# Patient Record
Sex: FEMALE | Race: WHITE | NOT HISPANIC OR LATINO | URBAN - METROPOLITAN AREA
[De-identification: names, ages, dates, MRNs, and addresses within clinical notes are randomized per-mention and may not be internally consistent; named-entity substitution may affect disease eponyms.]

---

## 2020-07-01 ENCOUNTER — COMMUNICATION - HEALTHEAST (OUTPATIENT)
Dept: SCHEDULING | Facility: CLINIC | Age: 25
End: 2020-07-01

## 2021-05-30 ENCOUNTER — RECORDS - HEALTHEAST (OUTPATIENT)
Dept: ADMINISTRATIVE | Facility: CLINIC | Age: 26
End: 2021-05-30

## 2021-06-02 ENCOUNTER — RECORDS - HEALTHEAST (OUTPATIENT)
Dept: ADMINISTRATIVE | Facility: CLINIC | Age: 26
End: 2021-06-02

## 2021-06-09 NOTE — TELEPHONE ENCOUNTER
Triage call:   Poison oak on her leg 2 weekends ago   Blister popped on her right leg- oozing fluid- she reports that this blister was very large   No blood- just clear fluid  Area is red   She still has a line of smaller blisters intact   No pain   intermittently itchy     COVID 19 Nurse Triage Plan/Patient Instructions    Please be aware that novel coronavirus (COVID-19) may be circulating in the community. If you develop symptoms such as fever, cough, or SOB or if you have concerns about the presence of another infection including coronavirus (COVID-19), please contact your health care provider or visit [email or web address].     Disposition/Instructions    Patient to schedule an In Person Visit with provider. Reference Visit Selection Guide. if no clinic appointments today then she was instructed to go to the [facility] in [place]- she verbalizes understanding.     Thank you for taking steps to prevent the spread of this virus.  o Limit your contact with others.  o Wear a simple mask to cover your cough.  o Wash your hands well and often.    Resources    [facility]: About COVID-19: [email or web address]    CDC: What to Do If You're Sick: www.cdc.gov/coronavirus/2019-ncov/about/steps-when-sick.html    CDC: Ending Home Isolation: www.cdc.gov/coronavirus/2019-ncov/hcp/disposition-in-home-patients.html     CDC: Caring for Someone: www.cdc.gov/coronavirus/2019-ncov/if-you-are-sick/care-for-someone.html     [facility]: Interim Guidance for Hospital Discharge to Home: www.health.[facility].mn.us/diseases/coronavirus/hcp/hospdischarge.pdf    [facility] clinical trials (COVID-19 research studies): clinicalaffairs.[facility]/umn-clinical-trials     Below are the COVID-19 hotlines at the [facility] ([facility]). Interpreters are available.   o For health questions: Call 115-635-1198 or 1-861.486.4658 (7 a.m. to 7 p.m.)  o For questions about schools and childcare: Call 671-961-1465 or 1-881.830.4567 (7 a.m. to 7  p.dacia Raymond RN BSBA [facility] Triage/Med Refill 7/1/2020 7:11 AM    Reason for Disposition    Big blisters or oozing sores    Additional Information    Negative: Doesn't match the SYMPTOMS of poison ivy, oak, or sumac    Negative: [1] Difficulty breathing or severe coughing AND [2] followed exposure to burning weeds    Negative: [1] Fever AND [2] bright red area or streak (from open poison ivy sores)    Negative: [1] Increasing redness around poison ivy AND [2] larger than 2 inches (5 cm)    Negative: [1] Severe poison ivy, oak, or sumac reaction in the past AND [2] face involved    Negative: SEVERE itching (e.g., interferes with sleep or normal activities)    Negative: Rash involves more than one-fourth of the body    Protocols used: POISON IVY - OAK - SUMAC-A-